# Patient Record
Sex: FEMALE | Race: WHITE
[De-identification: names, ages, dates, MRNs, and addresses within clinical notes are randomized per-mention and may not be internally consistent; named-entity substitution may affect disease eponyms.]

---

## 2017-02-10 ENCOUNTER — HOSPITAL ENCOUNTER (INPATIENT)
Dept: HOSPITAL 4 - SED | Age: 77
LOS: 2 days | Discharge: HOME | DRG: 193 | End: 2017-02-12
Payer: COMMERCIAL

## 2017-02-10 VITALS — WEIGHT: 166.03 LBS | HEIGHT: 60 IN | BODY MASS INDEX: 32.6 KG/M2

## 2017-02-10 VITALS
DIASTOLIC BLOOD PRESSURE: 67 MMHG | TEMPERATURE: 97.5 F | RESPIRATION RATE: 18 BRPM | SYSTOLIC BLOOD PRESSURE: 141 MMHG | HEART RATE: 78 BPM | OXYGEN SATURATION: 91 %

## 2017-02-10 DIAGNOSIS — I10: ICD-10-CM

## 2017-02-10 DIAGNOSIS — Z87.891: ICD-10-CM

## 2017-02-10 DIAGNOSIS — J18.9: Primary | ICD-10-CM

## 2017-02-10 DIAGNOSIS — D64.9: ICD-10-CM

## 2017-02-10 DIAGNOSIS — E11.00: ICD-10-CM

## 2017-02-10 DIAGNOSIS — Z79.899: ICD-10-CM

## 2017-02-10 DIAGNOSIS — E78.00: ICD-10-CM

## 2017-02-10 DIAGNOSIS — J44.9: ICD-10-CM

## 2017-02-10 LAB
ABG TOTAL HEMOGLOBIN: 10 G/DL (ref 12–18)
ALBUMIN SERPL BCP-MCNC: 2.8 G/DL (ref 3.4–4.8)
ALT SERPL W P-5'-P-CCNC: 19 U/L (ref 12–78)
ANION GAP SERPL CALCULATED.3IONS-SCNC: 8 MMOL/L (ref 5–15)
AST SERPL W P-5'-P-CCNC: 20 U/L (ref 10–37)
BASE EXCESS BLDA CALC-SCNC: 2.5 MMOL/L (ref -3–3)
BASOPHILS # BLD AUTO: 0 K/UL (ref 0–0.2)
BASOPHILS NFR BLD AUTO: 0.1 % (ref 0–2)
BILIRUB SERPL-MCNC: 0.2 MG/DL (ref 0–1)
BLOOD GAS COHB%: 0.3 % (ref 0.5–1.5)
BLOOD GAS HHB: 11.8 % (ref 0–6)
BLOOD O2HB%: 87.3 % (ref 94–97)
BUN SERPL-MCNC: 32 MG/DL (ref 8–21)
CALCIUM SERPL-MCNC: 8.5 MG/DL (ref 8.4–11)
CHLORIDE SERPL-SCNC: 100 MMOL/L (ref 98–107)
CREAT SERPL-MCNC: 0.98 MG/DL (ref 0.55–1.3)
EOSINOPHIL # BLD AUTO: 0 K/UL (ref 0–0.4)
EOSINOPHIL NFR BLD AUTO: 0.1 % (ref 0–4)
ERYTHROCYTE [DISTWIDTH] IN BLOOD BY AUTOMATED COUNT: 14.2 % (ref 9–15)
GFR SERPL CREATININE-BSD FRML MDRD: (no result) ML/MIN (ref 90–?)
GLUCOSE SERPL-MCNC: 413 MG/DL (ref 70–99)
HCT VFR BLD AUTO: 27.5 % (ref 36–48)
HGB BLD-MCNC: 8.9 G/DL (ref 12–16)
LYMPHOCYTES # BLD AUTO: 0.7 K/UL (ref 1–5.5)
LYMPHOCYTES NFR BLD AUTO: 18.4 % (ref 20.5–51.5)
MCH RBC QN AUTO: 31 PG (ref 27–31)
MCHC RBC AUTO-ENTMCNC: 32 % (ref 32–36)
MCV RBC AUTO: 95 FL (ref 79–98)
MONOCYTES # BLD MANUAL: 0.1 K/UL (ref 0–1)
MONOCYTES # BLD MANUAL: 2.5 % (ref 1.7–9.3)
NEUTROPHILS # BLD AUTO: 3.2 K/UL (ref 1.8–7.7)
NEUTROPHILS NFR BLD AUTO: 78.9 % (ref 40–70)
PH BLDA: 7.44 [PH] (ref 7.35–7.45)
PLATELET # BLD AUTO: 308 K/UL (ref 130–430)
POTASSIUM SERPL-SCNC: 4.2 MMOL/L (ref 3.5–5.1)
PROT SERPL-MCNC: 6.9 G/DL (ref 6.4–8.3)
RBC # BLD AUTO: 2.91 MIL/UL (ref 4.2–6.2)
SODIUM SERPLBLD-SCNC: 136 MMOL/L (ref 136–145)
WBC # BLD AUTO: 4 K/UL (ref 4.8–10.8)

## 2017-02-10 NOTE — NUR
Pt here in ED from urgent care center for diagnosis of pneumonia. Pt has hx of 
COPD, is at around 90 % O2 saturation at urgent care. Pt denied SOB or chest 
pain at moment, no respiratory distress noted. Pt was given Rocephin 1mg, 
solumedrol 125mg, and 1L NS at urgent care. X ray of chest was also done at the 
urgent care center. Pt's BS was 486 at noon per pt. At urgent care, pt was 
given 18 units of Regular insulin at around 1300. BS at moment is 342. Dr. Landa 
aware. Pt is alert and oriented x4, diminished lung sounds at bases, pt denied 
pain at moment. Pt is placed on cardiac and continuous pulse oximeter. Will 
continue to monitor pt.

## 2017-02-10 NOTE — NUR
Pt given extra pads and underwear, states "I wet my underwear when I cough a 
lot." Pt is at 91 % O2 sat MD ERICKA aware.

## 2017-02-11 VITALS
RESPIRATION RATE: 18 BRPM | DIASTOLIC BLOOD PRESSURE: 59 MMHG | TEMPERATURE: 97.4 F | SYSTOLIC BLOOD PRESSURE: 152 MMHG | OXYGEN SATURATION: 93 % | HEART RATE: 76 BPM

## 2017-02-11 VITALS
HEART RATE: 57 BPM | SYSTOLIC BLOOD PRESSURE: 148 MMHG | DIASTOLIC BLOOD PRESSURE: 53 MMHG | OXYGEN SATURATION: 98 % | TEMPERATURE: 97.5 F | RESPIRATION RATE: 18 BRPM

## 2017-02-11 VITALS
DIASTOLIC BLOOD PRESSURE: 66 MMHG | HEART RATE: 75 BPM | OXYGEN SATURATION: 95 % | SYSTOLIC BLOOD PRESSURE: 155 MMHG | RESPIRATION RATE: 17 BRPM | TEMPERATURE: 97.9 F

## 2017-02-11 VITALS
DIASTOLIC BLOOD PRESSURE: 59 MMHG | TEMPERATURE: 97.4 F | HEART RATE: 76 BPM | SYSTOLIC BLOOD PRESSURE: 152 MMHG | RESPIRATION RATE: 18 BRPM | OXYGEN SATURATION: 93 %

## 2017-02-11 VITALS
SYSTOLIC BLOOD PRESSURE: 137 MMHG | RESPIRATION RATE: 19 BRPM | TEMPERATURE: 98.5 F | DIASTOLIC BLOOD PRESSURE: 39 MMHG | HEART RATE: 73 BPM

## 2017-02-11 VITALS
RESPIRATION RATE: 16 BRPM | OXYGEN SATURATION: 95 % | TEMPERATURE: 97.9 F | HEART RATE: 54 BPM | DIASTOLIC BLOOD PRESSURE: 68 MMHG | SYSTOLIC BLOOD PRESSURE: 145 MMHG

## 2017-02-11 VITALS
HEART RATE: 63 BPM | DIASTOLIC BLOOD PRESSURE: 55 MMHG | OXYGEN SATURATION: 99 % | RESPIRATION RATE: 18 BRPM | TEMPERATURE: 98.1 F | SYSTOLIC BLOOD PRESSURE: 129 MMHG

## 2017-02-11 VITALS — SYSTOLIC BLOOD PRESSURE: 129 MMHG | HEART RATE: 63 BPM | DIASTOLIC BLOOD PRESSURE: 55 MMHG

## 2017-02-11 RX ADMIN — IPRATROPIUM BROMIDE AND ALBUTEROL SULFATE SCH ML: .5; 3 SOLUTION RESPIRATORY (INHALATION) at 13:38

## 2017-02-11 RX ADMIN — Medication SCH EA: at 17:07

## 2017-02-11 RX ADMIN — IPRATROPIUM BROMIDE AND ALBUTEROL SULFATE SCH ML: .5; 3 SOLUTION RESPIRATORY (INHALATION) at 21:02

## 2017-02-11 RX ADMIN — IPRATROPIUM BROMIDE AND ALBUTEROL SULFATE SCH ML: .5; 3 SOLUTION RESPIRATORY (INHALATION) at 07:23

## 2017-02-11 RX ADMIN — SODIUM CHLORIDE SCH MLS/HR: 0.9 INJECTION, SOLUTION INTRAVENOUS at 04:05

## 2017-02-11 RX ADMIN — HUMAN INSULIN PRN UNITS: 100 INJECTION, SOLUTION SUBCUTANEOUS at 06:10

## 2017-02-11 RX ADMIN — Medication SCH MG: at 21:04

## 2017-02-11 RX ADMIN — Medication SCH EA: at 11:25

## 2017-02-11 RX ADMIN — HUMAN INSULIN PRN UNITS: 100 INJECTION, SOLUTION SUBCUTANEOUS at 21:12

## 2017-02-11 RX ADMIN — HUMAN INSULIN PRN UNITS: 100 INJECTION, SOLUTION SUBCUTANEOUS at 11:28

## 2017-02-11 RX ADMIN — SIMVASTATIN SCH MG: 40 TABLET, FILM COATED ORAL at 21:05

## 2017-02-11 RX ADMIN — HUMAN INSULIN PRN UNITS: 100 INJECTION, SOLUTION SUBCUTANEOUS at 17:14

## 2017-02-11 RX ADMIN — GLIPIZIDE SCH MG: 5 TABLET, EXTENDED RELEASE ORAL at 21:05

## 2017-02-11 RX ADMIN — Medication SCH EA: at 21:08

## 2017-02-11 RX ADMIN — Medication SCH EA: at 06:04

## 2017-02-11 RX ADMIN — CEFTRIAXONE SODIUM SCH MLS/HR: 1 INJECTION, SOLUTION INTRAVENOUS at 03:27

## 2017-02-11 NOTE — NUR
NOTES

DR. HERNANDEZ CALLED ;INFORMED REGARDING PATIENT'S BLOOD SUGAR=453;WITH ORDERS AND CARRIED OUT

## 2017-02-11 NOTE — NUR
INITIAL NOTES

RECEIVED PATIENT ON BED ASLEEP.BREATHING EVEN AND UNLABORED WITH O2 AT 2L/M VIA NASAL 
CANNULA.IVF INFUSING WELL;NO SIGNS AND SYMPTOMS OF INFILTRATION.SAFETY AND FALL PRECAUTIONS 
IN PLACE.CALL LIGHT WITHIN REACH

## 2017-02-11 NOTE — NUR
notes

received the pt from the day nurse.pt a/a/ox4no c/o sob or pain ,o2 via nc in place at 
2l/min.iv intact,no redness or swelling noted.pt watching tv ,call light within reach.safety 
measures in progress.continue to monitor.

## 2017-02-11 NOTE — NUR
notes

received the pt from the ER,pt a/a/ox4 O2 VIA NC IN PLACE AT 2L/MIN.MONITOR APPLIED TO PTS 
CHEST AND SHOWS SR.PT DENIES PAIN AND  SOB AT THIS TIME.PT ORIENTED TO ROOM AND THE CALL 
LIGH AND BED CONTROLS..SANDWICH JUICE AND JELLO GIVEN PER REQUEST.

## 2017-02-11 NOTE — NUR
NOTES

DR. HERNANDEZ IN THE UNIT;INFORMED REGARDING PATIENT'S BLOOD SUGAR=504;WITH ORDER TO ADD ANOTHER 
8 UNITS IN ADDITION TO THE SLIDING SCALE;CARRIED OUT

## 2017-02-11 NOTE — NUR
NOTES

ASSISTED UP TO THE BATHROOM .PT WITH GOOD STEADY GAIT.VOIDED AND THEN ASSISTED BACK TO 
BED.CALL LIGHT WITHIN REACH.

## 2017-02-11 NOTE — NUR
NOTES

BLOOD SUGAR=504;NO SIGNS AND SYMPTOMS OF HYPERGLYCEMIA.PER PATIENT SHE HAS NOT TAKEN HER 
DIABETIC PILLS SINCE THURS BECAUSE SHE WENT TO URGENT CARE

## 2017-02-11 NOTE — NUR
Patient will be admitted to care of Dr. Perez.  Admitted to tele unit.  Will go 
to room 118-b.  Belongings list completed.  Summary report printed. Report 
given to HERMINIA Lara.

## 2017-02-11 NOTE — NUR
ADMISSION NOTE

Received patient from ER via gurney. Patient admitted with diagnosis of Pneumonia. Patient 
is awake, alert, oriented X 3. Patient oriented to hospital room, call light, toileting, 
pain management and safety-teach back done. Patient informed that Jordi will be primary 
nurse and that their room number is 118B. Personal belongings checked and Belongings List 
documented. Call light within reach.

## 2017-02-11 NOTE — NUR
NOTES

BLOOD SUGAR=453;CHECKED AND ASSESSED PATIENT;NO ACUTE DISTRESS;NO SIGNS AND SYMPTOMS OF 
HYPERGLYCEMIA

## 2017-02-11 NOTE — NUR
CLOSING NOTES

PATIENT ON BED AWAKE.BREATHING EVEN AND UNLABORED.NO ACUTE DISTRESS.IV SALINE LOCK INTACT 
AND PATENT;NO SIGNS AND SYMPTOMS OF INFILTRATION.SAFETY AND FALL PRECAUTIONS IN PLACE.CALL 
LIGHT WITHIN REACH.WILL ENDORSE TO NEXT SHIFT ACCORDINGLY

## 2017-02-12 VITALS
DIASTOLIC BLOOD PRESSURE: 62 MMHG | OXYGEN SATURATION: 98 % | SYSTOLIC BLOOD PRESSURE: 160 MMHG | TEMPERATURE: 97.6 F | RESPIRATION RATE: 18 BRPM | HEART RATE: 55 BPM

## 2017-02-12 VITALS
HEART RATE: 57 BPM | TEMPERATURE: 97.2 F | DIASTOLIC BLOOD PRESSURE: 52 MMHG | OXYGEN SATURATION: 99 % | RESPIRATION RATE: 18 BRPM | SYSTOLIC BLOOD PRESSURE: 134 MMHG

## 2017-02-12 VITALS
DIASTOLIC BLOOD PRESSURE: 62 MMHG | RESPIRATION RATE: 18 BRPM | SYSTOLIC BLOOD PRESSURE: 160 MMHG | HEART RATE: 55 BPM | TEMPERATURE: 97.6 F | OXYGEN SATURATION: 98 %

## 2017-02-12 VITALS
SYSTOLIC BLOOD PRESSURE: 160 MMHG | OXYGEN SATURATION: 98 % | HEART RATE: 55 BPM | RESPIRATION RATE: 18 BRPM | DIASTOLIC BLOOD PRESSURE: 62 MMHG | TEMPERATURE: 97.6 F

## 2017-02-12 VITALS
HEART RATE: 62 BPM | SYSTOLIC BLOOD PRESSURE: 157 MMHG | OXYGEN SATURATION: 96 % | RESPIRATION RATE: 18 BRPM | DIASTOLIC BLOOD PRESSURE: 55 MMHG | TEMPERATURE: 97.7 F

## 2017-02-12 VITALS
RESPIRATION RATE: 20 BRPM | HEART RATE: 64 BPM | TEMPERATURE: 97.3 F | SYSTOLIC BLOOD PRESSURE: 150 MMHG | OXYGEN SATURATION: 91 % | DIASTOLIC BLOOD PRESSURE: 51 MMHG

## 2017-02-12 VITALS
HEART RATE: 55 BPM | OXYGEN SATURATION: 98 % | DIASTOLIC BLOOD PRESSURE: 62 MMHG | SYSTOLIC BLOOD PRESSURE: 160 MMHG | TEMPERATURE: 97.6 F | RESPIRATION RATE: 18 BRPM

## 2017-02-12 VITALS
TEMPERATURE: 97.4 F | DIASTOLIC BLOOD PRESSURE: 47 MMHG | OXYGEN SATURATION: 96 % | HEART RATE: 51 BPM | SYSTOLIC BLOOD PRESSURE: 139 MMHG | RESPIRATION RATE: 17 BRPM

## 2017-02-12 VITALS
DIASTOLIC BLOOD PRESSURE: 52 MMHG | SYSTOLIC BLOOD PRESSURE: 131 MMHG | TEMPERATURE: 97.4 F | RESPIRATION RATE: 20 BRPM | OXYGEN SATURATION: 96 % | HEART RATE: 82 BPM

## 2017-02-12 LAB
ALBUMIN SERPL BCP-MCNC: 2.6 G/DL (ref 3.4–4.8)
ALT SERPL W P-5'-P-CCNC: 30 U/L (ref 12–78)
ANION GAP SERPL CALCULATED.3IONS-SCNC: 7 MMOL/L (ref 5–15)
AST SERPL W P-5'-P-CCNC: 22 U/L (ref 10–37)
BASOPHILS # BLD AUTO: 0 K/UL (ref 0–0.2)
BASOPHILS NFR BLD AUTO: 0 % (ref 0–2)
BILIRUB SERPL-MCNC: 0.2 MG/DL (ref 0–1)
BUN SERPL-MCNC: 28 MG/DL (ref 8–21)
CALCIUM SERPL-MCNC: 8.2 MG/DL (ref 8.4–11)
CHLORIDE SERPL-SCNC: 102 MMOL/L (ref 98–107)
CREAT SERPL-MCNC: 0.95 MG/DL (ref 0.55–1.3)
EOSINOPHIL # BLD AUTO: 0 K/UL (ref 0–0.4)
EOSINOPHIL NFR BLD AUTO: 0 % (ref 0–4)
ERYTHROCYTE [DISTWIDTH] IN BLOOD BY AUTOMATED COUNT: 13.7 % (ref 9–15)
GFR SERPL CREATININE-BSD FRML MDRD: (no result) ML/MIN (ref 90–?)
GLUCOSE SERPL-MCNC: 440 MG/DL (ref 70–99)
HCT VFR BLD AUTO: 26.1 % (ref 36–48)
HGB BLD-MCNC: 8.5 G/DL (ref 12–16)
IRON SERPL-MCNC: 11 MCG/DL (ref 37–145)
LYMPHOCYTES # BLD AUTO: 0.7 K/UL (ref 1–5.5)
LYMPHOCYTES NFR BLD AUTO: 7 % (ref 20.5–51.5)
MCH RBC QN AUTO: 31 PG (ref 27–31)
MCHC RBC AUTO-ENTMCNC: 33 % (ref 32–36)
MCV RBC AUTO: 94 FL (ref 79–98)
MONOCYTES # BLD MANUAL: 0.3 K/UL (ref 0–1)
MONOCYTES # BLD MANUAL: 2.7 % (ref 1.7–9.3)
NEUTROPHILS # BLD AUTO: 8.6 K/UL (ref 1.8–7.7)
NEUTROPHILS NFR BLD AUTO: 90.3 % (ref 40–70)
PLATELET # BLD AUTO: 329 K/UL (ref 130–430)
POTASSIUM SERPL-SCNC: 3.7 MMOL/L (ref 3.5–5.1)
PROT SERPL-MCNC: 6.2 G/DL (ref 6.4–8.3)
RBC # BLD AUTO: 2.77 MIL/UL (ref 4.2–6.2)
SODIUM SERPLBLD-SCNC: 138 MMOL/L (ref 136–145)
TIBC SERPL-MCNC: 252 UG/DL (ref 250–450)
WBC # BLD AUTO: 9.6 K/UL (ref 4.8–10.8)

## 2017-02-12 RX ADMIN — CEFTRIAXONE SODIUM SCH MLS/HR: 1 INJECTION, SOLUTION INTRAVENOUS at 01:49

## 2017-02-12 RX ADMIN — SIMVASTATIN SCH MG: 40 TABLET, FILM COATED ORAL at 20:39

## 2017-02-12 RX ADMIN — Medication SCH EA: at 05:54

## 2017-02-12 RX ADMIN — HUMAN INSULIN PRN UNITS: 100 INJECTION, SOLUTION SUBCUTANEOUS at 08:04

## 2017-02-12 RX ADMIN — IPRATROPIUM BROMIDE AND ALBUTEROL SULFATE SCH ML: .5; 3 SOLUTION RESPIRATORY (INHALATION) at 19:31

## 2017-02-12 RX ADMIN — GLIPIZIDE SCH MG: 5 TABLET, EXTENDED RELEASE ORAL at 09:17

## 2017-02-12 RX ADMIN — HUMAN INSULIN PRN UNITS: 100 INJECTION, SOLUTION SUBCUTANEOUS at 20:45

## 2017-02-12 RX ADMIN — Medication SCH EA: at 20:49

## 2017-02-12 RX ADMIN — HUMAN INSULIN PRN UNITS: 100 INJECTION, SOLUTION SUBCUTANEOUS at 17:29

## 2017-02-12 RX ADMIN — Medication SCH EA: at 17:28

## 2017-02-12 RX ADMIN — IPRATROPIUM BROMIDE AND ALBUTEROL SULFATE SCH ML: .5; 3 SOLUTION RESPIRATORY (INHALATION) at 04:23

## 2017-02-12 RX ADMIN — SODIUM CHLORIDE SCH MLS/HR: 0.9 INJECTION, SOLUTION INTRAVENOUS at 02:45

## 2017-02-12 RX ADMIN — Medication SCH MG: at 09:17

## 2017-02-12 RX ADMIN — HUMAN INSULIN PRN UNITS: 100 INJECTION, SOLUTION SUBCUTANEOUS at 06:00

## 2017-02-12 RX ADMIN — Medication SCH MG: at 20:40

## 2017-02-12 RX ADMIN — Medication SCH EA: at 11:27

## 2017-02-12 RX ADMIN — HUMAN INSULIN PRN UNITS: 100 INJECTION, SOLUTION SUBCUTANEOUS at 11:30

## 2017-02-12 RX ADMIN — GLIPIZIDE SCH MG: 5 TABLET, EXTENDED RELEASE ORAL at 20:39

## 2017-02-12 RX ADMIN — IPRATROPIUM BROMIDE AND ALBUTEROL SULFATE SCH ML: .5; 3 SOLUTION RESPIRATORY (INHALATION) at 17:45

## 2017-02-12 NOTE — NUR
Initial Note

Patient A/O x4. Respirations even and unlabored with use of nasal canula. Denies shortness 
of breath and difficulty breathing at this time. IV access patent. Use of call light 
reviewed with patient, she acknowledged understanding. Fall and safety precautions in place. 
Encouraged patient to call for assistance when needed. Bed in lowest and locked position.

## 2017-02-12 NOTE — NUR
returned call.i have apprised  of the pt's situation 2 b d/c home 
tonight. has maintained order 4 d/c home tonight.i have apprised the pt.2 d/c home 
tonight.

## 2017-02-12 NOTE — NUR
pt.assessed.v/s assessed.values w/in normal limits.pt.inquired if she is 2 b d/c.home 
tonight.am 2 f/u place call 2 

2larify pt's d/c.

## 2017-02-12 NOTE — NUR
Notes

Additional 7 units novolog administered. Juice at bedside in case of occurrence of 
hypoglycemia.

## 2017-02-12 NOTE — NUR
pt.assessed.apprised pt.that i have placed call 2  to f/u [facility] d/c [facility].

## 2017-02-12 NOTE — NUR
Closing Notes

Patient needs met throughout shift. Hourly rounds completed. Blood glucose has been reported 
and to Dr. Perez and addressed as ordered. Patient remained free of acute distress throughout 
shift. Will continue to monitor until patient care is endorsed to oncoming shift nurse.

## 2017-02-12 NOTE — NUR
pt.d/c home.pt's inctructions provided.belongings-list reviewed.flu vaccine declined per 
pt.dtr assisted.accompanied pt.home.

## 2017-02-12 NOTE — NUR
Notes

Dr. Perez has been informed of critical glucose level. She gave TO for one-time blood glucose 
spot check and administer coverage per sliding scale. TO entered and carried out. All TO 
orders entered. Dr. Perez has seen patient and is aware patient stated she feels dizzy. 
Orthostatic pressure check completed and charted; no drop in blood pressure noted. 
Encouraged patient to call for assistance.

## 2017-02-12 NOTE — NUR
Notes

Dr. Perez has been called to inform of critical glucose levels. 12 units regular insulin 
administered per sliding scale. Patient denies feelings of dizziness and shakiness at this 
time.

## 2017-02-12 NOTE — NUR
Notes

Spoke with Dr. Perez and informed her of last blood glucose of 314. Recommended if she would 
like to hold discharge and continue monitoring blood glucose. She stated patient does not 
meet criteria to hold discharge. She stated to administer additional 5 units of novolog SQ 
and discharge patient. Additional instructions for patient to monitor blood glucose and 
follow up with primary care provider tomorrow. Orders entered and will endorse care to 
oncoming shift nurse.

## 2017-02-15 NOTE — NUR
Discharge Follow Up Phone Call:

LCSW called and spoke with pt ([phone]).  Pt states that she is not feeling better; pt 
did not express any questions regarding discharge or medication instructions; pt attended 
PCP follow up appointment today; pt's PCP provided pt with prescriptions and instructions to 
address pt's current symptoms; pt checks her blood sugar as directed by PCP.  Pt did not 
express any other needs or concerns and denied the need for additional follow up at this 
time.  No further follow up phone calls required at this time.

## 2022-04-09 NOTE — NUR
change of shift.initial pt.assessment.apprised per day-shift nsg;susannah of elevated blood 
glucose:314mg/dl.pt.

pt.presents cough,sob upon excertion.o2 via nasal cannulae@2l/min.call light w/in pt's 
reach. Improved

## 2023-04-03 ENCOUNTER — HOSPITAL ENCOUNTER (INPATIENT)
Dept: HOSPITAL 4 - SED | Age: 83
LOS: 10 days | Discharge: HOME HEALTH | DRG: 637 | End: 2023-04-13
Attending: SPECIALIST | Admitting: SPECIALIST
Payer: COMMERCIAL

## 2023-04-03 VITALS — SYSTOLIC BLOOD PRESSURE: 135 MMHG

## 2023-04-03 VITALS — HEIGHT: 60 IN | BODY MASS INDEX: 31.8 KG/M2 | WEIGHT: 162 LBS

## 2023-04-03 VITALS — SYSTOLIC BLOOD PRESSURE: 130 MMHG

## 2023-04-03 VITALS — SYSTOLIC BLOOD PRESSURE: 151 MMHG

## 2023-04-03 VITALS — SYSTOLIC BLOOD PRESSURE: 157 MMHG

## 2023-04-03 DIAGNOSIS — K63.5: ICD-10-CM

## 2023-04-03 DIAGNOSIS — E87.6: ICD-10-CM

## 2023-04-03 DIAGNOSIS — E11.65: ICD-10-CM

## 2023-04-03 DIAGNOSIS — Z79.82: ICD-10-CM

## 2023-04-03 DIAGNOSIS — I50.43: ICD-10-CM

## 2023-04-03 DIAGNOSIS — Z20.822: ICD-10-CM

## 2023-04-03 DIAGNOSIS — E11.00: Primary | ICD-10-CM

## 2023-04-03 DIAGNOSIS — K62.1: ICD-10-CM

## 2023-04-03 DIAGNOSIS — E88.09: ICD-10-CM

## 2023-04-03 DIAGNOSIS — D50.9: ICD-10-CM

## 2023-04-03 DIAGNOSIS — K59.00: ICD-10-CM

## 2023-04-03 DIAGNOSIS — I11.0: ICD-10-CM

## 2023-04-03 DIAGNOSIS — N17.9: ICD-10-CM

## 2023-04-03 DIAGNOSIS — E83.51: ICD-10-CM

## 2023-04-03 DIAGNOSIS — J44.0: ICD-10-CM

## 2023-04-03 DIAGNOSIS — E43: ICD-10-CM

## 2023-04-03 DIAGNOSIS — Z87.891: ICD-10-CM

## 2023-04-03 DIAGNOSIS — D53.9: ICD-10-CM

## 2023-04-03 DIAGNOSIS — Z79.84: ICD-10-CM

## 2023-04-03 DIAGNOSIS — E78.00: ICD-10-CM

## 2023-04-03 DIAGNOSIS — E83.52: ICD-10-CM

## 2023-04-03 DIAGNOSIS — J18.9: ICD-10-CM

## 2023-04-03 DIAGNOSIS — J96.01: ICD-10-CM

## 2023-04-03 DIAGNOSIS — K25.9: ICD-10-CM

## 2023-04-03 DIAGNOSIS — Z79.4: ICD-10-CM

## 2023-04-03 DIAGNOSIS — I35.0: ICD-10-CM

## 2023-04-03 DIAGNOSIS — K64.4: ICD-10-CM

## 2023-04-03 LAB
ACETONE EXG QL: (no result)
ALBUMIN SERPL BCP-MCNC: 1.5 G/DL (ref 3.4–4.8)
ALT SERPL W P-5'-P-CCNC: 20 U/L (ref 12–78)
ANION GAP SERPL CALCULATED.3IONS-SCNC: 13 MMOL/L (ref 5–15)
APPEARANCE UR: CLEAR
AST SERPL W P-5'-P-CCNC: 27 U/L (ref 10–37)
BACTERIA URNS QL MICRO: (no result) /HPF
BASOPHILS # BLD AUTO: 0.1 K/UL (ref 0–0.2)
BASOPHILS NFR BLD AUTO: 0.9 % (ref 0–2)
BILIRUB SERPL-MCNC: 0.4 MG/DL (ref 0–1)
BILIRUB UR QL STRIP: NEGATIVE
BUN SERPL-MCNC: 68 MG/DL (ref 8–21)
CALCIUM SERPL-MCNC: 7.7 MG/DL (ref 8.4–11)
CHLORIDE SERPL-SCNC: 100 MMOL/L (ref 98–107)
COLOR UR: YELLOW
CREAT SERPL-MCNC: 1.85 MG/DL (ref 0.55–1.3)
EOSINOPHIL # BLD AUTO: 0 K/UL (ref 0–0.4)
EOSINOPHIL NFR BLD AUTO: 0 % (ref 0–4)
ERYTHROCYTE [DISTWIDTH] IN BLOOD BY AUTOMATED COUNT: 16.6 % (ref 9–15)
GFR SERPL CREATININE-BSD FRML MDRD: (no result) ML/MIN (ref 90–?)
GLUCOSE SERPL-MCNC: 376 MG/DL (ref 70–99)
GLUCOSE UR STRIP-MCNC: (no result) MG/DL
HCT VFR BLD AUTO: 11.9 % (ref 36–48)
HGB BLD-MCNC: 3.8 G/DL (ref 12–16)
HGB UR QL STRIP: (no result)
KETONES UR STRIP-MCNC: (no result) MG/DL
LEUKOCYTE ESTERASE UR QL STRIP: NEGATIVE
LIPASE SERPL-CCNC: 123 U/L (ref 73–393)
LYMPHOCYTES # BLD AUTO: 1.3 K/UL (ref 1–5.5)
LYMPHOCYTES NFR BLD AUTO: 19.3 % (ref 20.5–51.5)
MCH RBC QN AUTO: 32 PG (ref 27–31)
MCHC RBC AUTO-ENTMCNC: 32 % (ref 32–36)
MCV RBC AUTO: 101 FL (ref 79–98)
MONOCYTES # BLD MANUAL: 0.2 K/UL (ref 0–1)
MONOCYTES # BLD MANUAL: 3.7 % (ref 1.7–9.3)
NEUTROPHILS # BLD AUTO: 5 K/UL (ref 1.8–7.7)
NEUTROPHILS NFR BLD AUTO: 76.1 % (ref 40–70)
NITRITE UR QL STRIP: NEGATIVE
PH UR STRIP: 6 [PH] (ref 5–8)
PLATELET # BLD AUTO: 299 K/UL (ref 130–430)
PROT UR QL STRIP: (no result)
RBC # BLD AUTO: 1.17 MIL/UL (ref 4.2–6.2)
RBC #/AREA URNS HPF: (no result) /HPF (ref 0–3)
SP GR UR STRIP: 1.02 (ref 1–1.03)
TIBC SERPL-MCNC: 209 UG/DL (ref 250–450)
UROBILINOGEN UR STRIP-MCNC: 0.2 MG/DL (ref 0.2–1)
WBC # BLD AUTO: 6.6 K/UL (ref 4.8–10.8)
WBC #/AREA URNS HPF: (no result) /HPF (ref 0–3)

## 2023-04-03 PROCEDURE — G0378 HOSPITAL OBSERVATION PER HR: HCPCS

## 2023-04-03 PROCEDURE — C9113 INJ PANTOPRAZOLE SODIUM, VIA: HCPCS

## 2023-04-03 PROCEDURE — 30233N1 TRANSFUSION OF NONAUTOLOGOUS RED BLOOD CELLS INTO PERIPHERAL VEIN, PERCUTANEOUS APPROACH: ICD-10-PCS | Performed by: FAMILY MEDICINE

## 2023-04-03 PROCEDURE — P9041 ALBUMIN (HUMAN),5%, 50ML: HCPCS

## 2023-04-03 PROCEDURE — P9021 RED BLOOD CELLS UNIT: HCPCS

## 2023-04-03 PROCEDURE — A9575 INJ GADOTERATE MEGLUMI 0.1ML: HCPCS

## 2023-04-04 VITALS — SYSTOLIC BLOOD PRESSURE: 153 MMHG

## 2023-04-04 VITALS — SYSTOLIC BLOOD PRESSURE: 154 MMHG

## 2023-04-04 VITALS — SYSTOLIC BLOOD PRESSURE: 160 MMHG

## 2023-04-04 VITALS — SYSTOLIC BLOOD PRESSURE: 120 MMHG

## 2023-04-04 LAB
ANION GAP SERPL CALCULATED.3IONS-SCNC: 5 MMOL/L (ref 5–15)
BASOPHILS # BLD AUTO: 0.1 K/UL (ref 0–0.2)
BASOPHILS NFR BLD AUTO: 1 % (ref 0–2)
BUN SERPL-MCNC: 55 MG/DL (ref 8–21)
CALCIUM SERPL-MCNC: 7.6 MG/DL (ref 8.4–11)
CHLORIDE SERPL-SCNC: 102 MMOL/L (ref 98–107)
CREAT SERPL-MCNC: 1.67 MG/DL (ref 0.55–1.3)
EOSINOPHIL # BLD AUTO: 0.1 K/UL (ref 0–0.4)
EOSINOPHIL NFR BLD AUTO: 0.7 % (ref 0–4)
ERYTHROCYTE [DISTWIDTH] IN BLOOD BY AUTOMATED COUNT: 18.4 % (ref 9–15)
FERRITIN: 48 NG/ML (ref 15–150)
FOLATE (FOLIC ACID): >20 NG/ML (ref 3–?)
GFR SERPL CREATININE-BSD FRML MDRD: (no result) ML/MIN (ref 90–?)
GLUCOSE SERPL-MCNC: 531 MG/DL (ref 70–99)
HCT VFR BLD AUTO: 22.6 % (ref 36–48)
HGB BLD-MCNC: 7.3 G/DL (ref 12–16)
LYMPHOCYTES # BLD AUTO: 1.1 K/UL (ref 1–5.5)
LYMPHOCYTES NFR BLD AUTO: 11.3 % (ref 20.5–51.5)
MCH RBC QN AUTO: 30 PG (ref 27–31)
MCHC RBC AUTO-ENTMCNC: 32 % (ref 32–36)
MCV RBC AUTO: 92 FL (ref 79–98)
MONOCYTES # BLD MANUAL: 0.5 K/UL (ref 0–1)
MONOCYTES # BLD MANUAL: 5.5 % (ref 1.7–9.3)
NEUTROPHILS # BLD AUTO: 7.8 K/UL (ref 1.8–7.7)
NEUTROPHILS NFR BLD AUTO: 81.5 % (ref 40–70)
PLATELET # BLD AUTO: 298 K/UL (ref 130–430)
RBC # BLD AUTO: 2.44 MIL/UL (ref 4.2–6.2)
TSH SERPL DL<=0.05 MIU/L-ACNC: 2.35 UIU/ML (ref 0.34–4.82)
VIT B12 SERPL-MCNC: >2000 PG/ML (ref 232–1245)
WBC # BLD AUTO: 9.6 K/UL (ref 4.8–10.8)

## 2023-04-04 RX ADMIN — Medication SCH EA: at 16:26

## 2023-04-04 RX ADMIN — SODIUM CHLORIDE SCH MG: 9 INJECTION, SOLUTION INTRAVENOUS at 21:06

## 2023-04-04 RX ADMIN — CARVEDILOL SCH MG: 25 TABLET, FILM COATED ORAL at 21:07

## 2023-04-04 RX ADMIN — Medication SCH EA: at 21:07

## 2023-04-04 RX ADMIN — SIMVASTATIN SCH MG: 40 TABLET, FILM COATED ORAL at 21:07

## 2023-04-04 RX ADMIN — SODIUM CHLORIDE SCH MLS/HR: 9 INJECTION, SOLUTION INTRAVENOUS at 15:45

## 2023-04-04 RX ADMIN — HUMAN INSULIN PRN UNITS: 100 INJECTION, SOLUTION SUBCUTANEOUS at 08:39

## 2023-04-04 RX ADMIN — HUMAN INSULIN PRN UNITS: 100 INJECTION, SOLUTION SUBCUTANEOUS at 16:43

## 2023-04-05 VITALS — SYSTOLIC BLOOD PRESSURE: 156 MMHG

## 2023-04-05 VITALS — SYSTOLIC BLOOD PRESSURE: 146 MMHG

## 2023-04-05 VITALS — SYSTOLIC BLOOD PRESSURE: 124 MMHG

## 2023-04-05 VITALS — SYSTOLIC BLOOD PRESSURE: 135 MMHG

## 2023-04-05 VITALS — SYSTOLIC BLOOD PRESSURE: 121 MMHG

## 2023-04-05 LAB
ALBUMIN SERPL BCP-MCNC: 1.3 G/DL (ref 3.4–4.8)
ALT SERPL W P-5'-P-CCNC: 89 U/L (ref 12–78)
ANION GAP SERPL CALCULATED.3IONS-SCNC: 8 MMOL/L (ref 5–15)
AST SERPL W P-5'-P-CCNC: 70 U/L (ref 10–37)
BASOPHILS # BLD AUTO: 0.1 K/UL (ref 0–0.2)
BASOPHILS NFR BLD AUTO: 0.8 % (ref 0–2)
BILIRUB SERPL-MCNC: 0.6 MG/DL (ref 0–1)
BUN SERPL-MCNC: 43 MG/DL (ref 8–21)
CALCIUM SERPL-MCNC: 7.5 MG/DL (ref 8.4–11)
CHLORIDE SERPL-SCNC: 105 MMOL/L (ref 98–107)
CREAT SERPL-MCNC: 1.42 MG/DL (ref 0.55–1.3)
EOSINOPHIL # BLD AUTO: 0.4 K/UL (ref 0–0.4)
EOSINOPHIL NFR BLD AUTO: 4 % (ref 0–4)
ERYTHROCYTE [DISTWIDTH] IN BLOOD BY AUTOMATED COUNT: 18.2 % (ref 9–15)
GFR SERPL CREATININE-BSD FRML MDRD: (no result) ML/MIN (ref 90–?)
GLUCOSE SERPL-MCNC: 229 MG/DL (ref 70–99)
HCT VFR BLD AUTO: 21.1 % (ref 36–48)
HCT VFR BLD AUTO: 25.5 % (ref 36–48)
HGB BLD-MCNC: 7 G/DL (ref 12–16)
HGB BLD-MCNC: 8.5 G/DL (ref 12–16)
LYMPHOCYTES # BLD AUTO: 1.4 K/UL (ref 1–5.5)
LYMPHOCYTES NFR BLD AUTO: 14.8 % (ref 20.5–51.5)
MCH RBC QN AUTO: 30 PG (ref 27–31)
MCHC RBC AUTO-ENTMCNC: 33 % (ref 32–36)
MCV RBC AUTO: 91 FL (ref 79–98)
MONOCYTES # BLD MANUAL: 0.5 K/UL (ref 0–1)
MONOCYTES # BLD MANUAL: 5.1 % (ref 1.7–9.3)
NEUTROPHILS # BLD AUTO: 7.3 K/UL (ref 1.8–7.7)
NEUTROPHILS NFR BLD AUTO: 75.3 % (ref 40–70)
PHOSPHATE SERPL-MCNC: 3 MG/DL (ref 2.7–4.5)
PLATELET # BLD AUTO: 284 K/UL (ref 130–430)
RBC # BLD AUTO: 2.33 MIL/UL (ref 4.2–6.2)
WBC # BLD AUTO: 9.7 K/UL (ref 4.8–10.8)

## 2023-04-05 RX ADMIN — Medication SCH UNIT: at 08:36

## 2023-04-05 RX ADMIN — Medication SCH EA: at 16:41

## 2023-04-05 RX ADMIN — HUMAN INSULIN PRN UNITS: 100 INJECTION, SOLUTION SUBCUTANEOUS at 16:52

## 2023-04-05 RX ADMIN — CARVEDILOL SCH MG: 25 TABLET, FILM COATED ORAL at 21:19

## 2023-04-05 RX ADMIN — SODIUM FERRIC GLUCONATE COMPLEX IN SUCROSE SCH MLS/HR: 12.5 INJECTION INTRAVENOUS at 10:47

## 2023-04-05 RX ADMIN — SODIUM CHLORIDE SCH MG: 9 INJECTION, SOLUTION INTRAVENOUS at 21:20

## 2023-04-05 RX ADMIN — SODIUM CHLORIDE SCH MLS/HR: 9 INJECTION, SOLUTION INTRAVENOUS at 05:58

## 2023-04-05 RX ADMIN — CARVEDILOL SCH MG: 25 TABLET, FILM COATED ORAL at 08:35

## 2023-04-05 RX ADMIN — SIMVASTATIN SCH MG: 40 TABLET, FILM COATED ORAL at 21:19

## 2023-04-05 RX ADMIN — Medication SCH EA: at 21:20

## 2023-04-05 RX ADMIN — HUMAN INSULIN PRN UNITS: 100 INJECTION, SOLUTION SUBCUTANEOUS at 11:58

## 2023-04-05 RX ADMIN — HUMAN INSULIN PRN UNITS: 100 INJECTION, SOLUTION SUBCUTANEOUS at 21:27

## 2023-04-05 RX ADMIN — Medication SCH EA: at 11:36

## 2023-04-05 RX ADMIN — Medication SCH EA: at 06:01

## 2023-04-05 RX ADMIN — SODIUM CHLORIDE SCH MG: 9 INJECTION, SOLUTION INTRAVENOUS at 08:37

## 2023-04-06 VITALS — SYSTOLIC BLOOD PRESSURE: 134 MMHG

## 2023-04-06 VITALS — SYSTOLIC BLOOD PRESSURE: 155 MMHG

## 2023-04-06 VITALS — SYSTOLIC BLOOD PRESSURE: 162 MMHG

## 2023-04-06 VITALS — SYSTOLIC BLOOD PRESSURE: 152 MMHG

## 2023-04-06 VITALS — SYSTOLIC BLOOD PRESSURE: 145 MMHG

## 2023-04-06 LAB
ANION GAP SERPL CALCULATED.3IONS-SCNC: 7 MMOL/L (ref 5–15)
BASOPHILS # BLD AUTO: 0.1 K/UL (ref 0–0.2)
BASOPHILS NFR BLD AUTO: 0.7 % (ref 0–2)
BUN SERPL-MCNC: 31 MG/DL (ref 8–21)
CALCIUM SERPL-MCNC: 7.5 MG/DL (ref 8.4–11)
CHLORIDE SERPL-SCNC: 107 MMOL/L (ref 98–107)
CREAT SERPL-MCNC: 1.34 MG/DL (ref 0.55–1.3)
EOSINOPHIL # BLD AUTO: 0.3 K/UL (ref 0–0.4)
EOSINOPHIL NFR BLD AUTO: 3.9 % (ref 0–4)
ERYTHROCYTE [DISTWIDTH] IN BLOOD BY AUTOMATED COUNT: 17.1 % (ref 9–15)
GFR SERPL CREATININE-BSD FRML MDRD: (no result) ML/MIN (ref 90–?)
GLUCOSE SERPL-MCNC: 129 MG/DL (ref 70–99)
HCT VFR BLD AUTO: 24.6 % (ref 36–48)
HGB BLD-MCNC: 8.1 G/DL (ref 12–16)
LYMPHOCYTES # BLD AUTO: 1.5 K/UL (ref 1–5.5)
LYMPHOCYTES NFR BLD AUTO: 16.7 % (ref 20.5–51.5)
MCH RBC QN AUTO: 30 PG (ref 27–31)
MCHC RBC AUTO-ENTMCNC: 33 % (ref 32–36)
MCV RBC AUTO: 89 FL (ref 79–98)
MONOCYTES # BLD MANUAL: 0.5 K/UL (ref 0–1)
MONOCYTES # BLD MANUAL: 5.8 % (ref 1.7–9.3)
NEUTROPHILS # BLD AUTO: 6.3 K/UL (ref 1.8–7.7)
NEUTROPHILS NFR BLD AUTO: 72.9 % (ref 40–70)
PHOSPHATE SERPL-MCNC: 2.7 MG/DL (ref 2.7–4.5)
PLATELET # BLD AUTO: 250 K/UL (ref 130–430)
RBC # BLD AUTO: 2.75 MIL/UL (ref 4.2–6.2)
WBC # BLD AUTO: 8.7 K/UL (ref 4.8–10.8)

## 2023-04-06 PROCEDURE — 0DBP8ZZ EXCISION OF RECTUM, VIA NATURAL OR ARTIFICIAL OPENING ENDOSCOPIC: ICD-10-PCS | Performed by: INTERNAL MEDICINE

## 2023-04-06 PROCEDURE — 0DB78ZX EXCISION OF STOMACH, PYLORUS, VIA NATURAL OR ARTIFICIAL OPENING ENDOSCOPIC, DIAGNOSTIC: ICD-10-PCS | Performed by: INTERNAL MEDICINE

## 2023-04-06 PROCEDURE — 0DB58ZX EXCISION OF ESOPHAGUS, VIA NATURAL OR ARTIFICIAL OPENING ENDOSCOPIC, DIAGNOSTIC: ICD-10-PCS | Performed by: INTERNAL MEDICINE

## 2023-04-06 PROCEDURE — 0DB98ZX EXCISION OF DUODENUM, VIA NATURAL OR ARTIFICIAL OPENING ENDOSCOPIC, DIAGNOSTIC: ICD-10-PCS | Performed by: INTERNAL MEDICINE

## 2023-04-06 PROCEDURE — 0DBK8ZZ EXCISION OF ASCENDING COLON, VIA NATURAL OR ARTIFICIAL OPENING ENDOSCOPIC: ICD-10-PCS | Performed by: INTERNAL MEDICINE

## 2023-04-06 PROCEDURE — 30233N1 TRANSFUSION OF NONAUTOLOGOUS RED BLOOD CELLS INTO PERIPHERAL VEIN, PERCUTANEOUS APPROACH: ICD-10-PCS | Performed by: INTERNAL MEDICINE

## 2023-04-06 RX ADMIN — Medication SCH EA: at 06:04

## 2023-04-06 RX ADMIN — HUMAN INSULIN PRN UNITS: 100 INJECTION, SOLUTION SUBCUTANEOUS at 17:55

## 2023-04-06 RX ADMIN — SODIUM CHLORIDE SCH MG: 9 INJECTION, SOLUTION INTRAVENOUS at 09:34

## 2023-04-06 RX ADMIN — Medication SCH EA: at 11:24

## 2023-04-06 RX ADMIN — SIMVASTATIN SCH MG: 40 TABLET, FILM COATED ORAL at 21:48

## 2023-04-06 RX ADMIN — HUMAN INSULIN PRN UNITS: 100 INJECTION, SOLUTION SUBCUTANEOUS at 21:55

## 2023-04-06 RX ADMIN — SODIUM CHLORIDE SCH MG: 9 INJECTION, SOLUTION INTRAVENOUS at 23:48

## 2023-04-06 RX ADMIN — SODIUM FERRIC GLUCONATE COMPLEX IN SUCROSE SCH MLS/HR: 12.5 INJECTION INTRAVENOUS at 09:34

## 2023-04-06 RX ADMIN — Medication SCH UNIT: at 09:33

## 2023-04-06 RX ADMIN — Medication SCH EA: at 21:48

## 2023-04-06 RX ADMIN — HUMAN INSULIN PRN UNITS: 100 INJECTION, SOLUTION SUBCUTANEOUS at 11:51

## 2023-04-06 RX ADMIN — CARVEDILOL SCH MG: 25 TABLET, FILM COATED ORAL at 21:47

## 2023-04-06 RX ADMIN — CARVEDILOL SCH MG: 25 TABLET, FILM COATED ORAL at 09:33

## 2023-04-06 RX ADMIN — Medication SCH EA: at 17:55

## 2023-04-07 VITALS — SYSTOLIC BLOOD PRESSURE: 129 MMHG

## 2023-04-07 VITALS — SYSTOLIC BLOOD PRESSURE: 156 MMHG

## 2023-04-07 VITALS — SYSTOLIC BLOOD PRESSURE: 147 MMHG

## 2023-04-07 VITALS — SYSTOLIC BLOOD PRESSURE: 150 MMHG

## 2023-04-07 LAB
ALBUMIN SERPL BCP-MCNC: 1.3 G/DL (ref 3.4–4.8)
ALT SERPL W P-5'-P-CCNC: 46 U/L (ref 12–78)
ANION GAP SERPL CALCULATED.3IONS-SCNC: 4 MMOL/L (ref 5–15)
AST SERPL W P-5'-P-CCNC: 23 U/L (ref 10–37)
BASOPHILS # BLD AUTO: 0 K/UL (ref 0–0.2)
BASOPHILS NFR BLD AUTO: 0.4 % (ref 0–2)
BILIRUB SERPL-MCNC: 0.3 MG/DL (ref 0–1)
BUN SERPL-MCNC: 29 MG/DL (ref 8–21)
CALCIUM SERPL-MCNC: 7.4 MG/DL (ref 8.4–11)
CHLORIDE SERPL-SCNC: 108 MMOL/L (ref 98–107)
CREAT SERPL-MCNC: 1.57 MG/DL (ref 0.55–1.3)
EOSINOPHIL # BLD AUTO: 0.3 K/UL (ref 0–0.4)
EOSINOPHIL NFR BLD AUTO: 3.5 % (ref 0–4)
ERYTHROCYTE [DISTWIDTH] IN BLOOD BY AUTOMATED COUNT: 17.2 % (ref 9–15)
GFR SERPL CREATININE-BSD FRML MDRD: (no result) ML/MIN (ref 90–?)
GLUCOSE SERPL-MCNC: 161 MG/DL (ref 70–99)
HCT VFR BLD AUTO: 24.6 % (ref 36–48)
HGB BLD-MCNC: 8 G/DL (ref 12–16)
LYMPHOCYTES # BLD AUTO: 1.3 K/UL (ref 1–5.5)
LYMPHOCYTES NFR BLD AUTO: 14.4 % (ref 20.5–51.5)
MCH RBC QN AUTO: 30 PG (ref 27–31)
MCHC RBC AUTO-ENTMCNC: 33 % (ref 32–36)
MCV RBC AUTO: 90 FL (ref 79–98)
MONOCYTES # BLD MANUAL: 0.6 K/UL (ref 0–1)
MONOCYTES # BLD MANUAL: 6.2 % (ref 1.7–9.3)
NEUTROPHILS # BLD AUTO: 6.9 K/UL (ref 1.8–7.7)
NEUTROPHILS NFR BLD AUTO: 75.5 % (ref 40–70)
PHOSPHATE SERPL-MCNC: 2.8 MG/DL (ref 2.7–4.5)
PLATELET # BLD AUTO: 249 K/UL (ref 130–430)
RBC # BLD AUTO: 2.73 MIL/UL (ref 4.2–6.2)
WBC # BLD AUTO: 9.1 K/UL (ref 4.8–10.8)

## 2023-04-07 RX ADMIN — INSULIN GLARGINE SCH UNITS: 100 INJECTION, SOLUTION SUBCUTANEOUS at 21:38

## 2023-04-07 RX ADMIN — Medication SCH EA: at 06:36

## 2023-04-07 RX ADMIN — CARVEDILOL SCH MG: 25 TABLET, FILM COATED ORAL at 20:47

## 2023-04-07 RX ADMIN — SODIUM CHLORIDE SCH MLS/HR: 9 INJECTION, SOLUTION INTRAVENOUS at 19:00

## 2023-04-07 RX ADMIN — HUMAN INSULIN PRN UNITS: 100 INJECTION, SOLUTION SUBCUTANEOUS at 06:39

## 2023-04-07 RX ADMIN — CARVEDILOL SCH MG: 25 TABLET, FILM COATED ORAL at 09:38

## 2023-04-07 RX ADMIN — SODIUM FERRIC GLUCONATE COMPLEX IN SUCROSE SCH MLS/HR: 12.5 INJECTION INTRAVENOUS at 09:37

## 2023-04-07 RX ADMIN — SIMVASTATIN SCH MG: 40 TABLET, FILM COATED ORAL at 21:09

## 2023-04-07 RX ADMIN — INSULIN GLARGINE SCH UNITS: 100 INJECTION, SOLUTION SUBCUTANEOUS at 09:42

## 2023-04-07 RX ADMIN — SIMETHICONE CHEW TAB 80 MG SCH MG: 80 TABLET ORAL at 18:01

## 2023-04-07 RX ADMIN — Medication SCH EA: at 11:51

## 2023-04-07 RX ADMIN — SODIUM CHLORIDE SCH MG: 9 INJECTION, SOLUTION INTRAVENOUS at 09:38

## 2023-04-07 RX ADMIN — HUMAN INSULIN PRN UNITS: 100 INJECTION, SOLUTION SUBCUTANEOUS at 12:32

## 2023-04-07 RX ADMIN — Medication SCH UNIT: at 09:38

## 2023-04-07 RX ADMIN — SODIUM CHLORIDE SCH MG: 9 INJECTION, SOLUTION INTRAVENOUS at 20:45

## 2023-04-07 RX ADMIN — Medication SCH EA: at 21:06

## 2023-04-07 RX ADMIN — HUMAN INSULIN PRN UNITS: 100 INJECTION, SOLUTION SUBCUTANEOUS at 18:03

## 2023-04-07 RX ADMIN — Medication SCH EA: at 18:01

## 2023-04-07 RX ADMIN — SIMETHICONE CHEW TAB 80 MG SCH MG: 80 TABLET ORAL at 20:46

## 2023-04-08 VITALS — SYSTOLIC BLOOD PRESSURE: 154 MMHG

## 2023-04-08 VITALS — SYSTOLIC BLOOD PRESSURE: 153 MMHG

## 2023-04-08 VITALS — SYSTOLIC BLOOD PRESSURE: 157 MMHG

## 2023-04-08 VITALS — SYSTOLIC BLOOD PRESSURE: 151 MMHG

## 2023-04-08 LAB
ANION GAP SERPL CALCULATED.3IONS-SCNC: 6 MMOL/L (ref 5–15)
BASOPHILS # BLD AUTO: 0 K/UL (ref 0–0.2)
BASOPHILS NFR BLD AUTO: 0.4 % (ref 0–2)
BUN SERPL-MCNC: 22 MG/DL (ref 8–21)
CALCIUM SERPL-MCNC: 7.3 MG/DL (ref 8.4–11)
CHLORIDE SERPL-SCNC: 113 MMOL/L (ref 98–107)
CREAT SERPL-MCNC: 1.28 MG/DL (ref 0.55–1.3)
EOSINOPHIL # BLD AUTO: 0.2 K/UL (ref 0–0.4)
EOSINOPHIL NFR BLD AUTO: 3.3 % (ref 0–4)
ERYTHROCYTE [DISTWIDTH] IN BLOOD BY AUTOMATED COUNT: 17.3 % (ref 9–15)
GFR SERPL CREATININE-BSD FRML MDRD: (no result) ML/MIN (ref 90–?)
GLUCOSE SERPL-MCNC: 60 MG/DL (ref 70–99)
HCT VFR BLD AUTO: 25.4 % (ref 36–48)
HGB BLD-MCNC: 8.2 G/DL (ref 12–16)
LYMPHOCYTES # BLD AUTO: 0.8 K/UL (ref 1–5.5)
LYMPHOCYTES NFR BLD AUTO: 10.4 % (ref 20.5–51.5)
MCH RBC QN AUTO: 30 PG (ref 27–31)
MCHC RBC AUTO-ENTMCNC: 32 % (ref 32–36)
MCV RBC AUTO: 91 FL (ref 79–98)
MONOCYTES # BLD MANUAL: 0.4 K/UL (ref 0–1)
MONOCYTES # BLD MANUAL: 4.9 % (ref 1.7–9.3)
NEUTROPHILS # BLD AUTO: 6.1 K/UL (ref 1.8–7.7)
NEUTROPHILS NFR BLD AUTO: 81 % (ref 40–70)
PHOSPHATE SERPL-MCNC: 2.8 MG/DL (ref 2.7–4.5)
PLATELET # BLD AUTO: 231 K/UL (ref 130–430)
RBC # BLD AUTO: 2.79 MIL/UL (ref 4.2–6.2)
WBC # BLD AUTO: 7.5 K/UL (ref 4.8–10.8)

## 2023-04-08 RX ADMIN — Medication SCH EA: at 12:15

## 2023-04-08 RX ADMIN — SIMETHICONE CHEW TAB 80 MG SCH MG: 80 TABLET ORAL at 21:08

## 2023-04-08 RX ADMIN — SIMVASTATIN SCH MG: 40 TABLET, FILM COATED ORAL at 21:07

## 2023-04-08 RX ADMIN — SODIUM CHLORIDE SCH MLS/HR: 9 INJECTION, SOLUTION INTRAVENOUS at 15:02

## 2023-04-08 RX ADMIN — Medication SCH EA: at 16:31

## 2023-04-08 RX ADMIN — Medication SCH EA: at 07:28

## 2023-04-08 RX ADMIN — SODIUM CHLORIDE SCH MG: 9 INJECTION, SOLUTION INTRAVENOUS at 09:09

## 2023-04-08 RX ADMIN — Medication SCH UNIT: at 09:10

## 2023-04-08 RX ADMIN — Medication SCH MG: at 09:10

## 2023-04-08 RX ADMIN — INSULIN GLARGINE SCH UNITS: 100 INJECTION, SOLUTION SUBCUTANEOUS at 21:16

## 2023-04-08 RX ADMIN — SIMETHICONE CHEW TAB 80 MG SCH MG: 80 TABLET ORAL at 16:32

## 2023-04-08 RX ADMIN — SIMETHICONE CHEW TAB 80 MG SCH MG: 80 TABLET ORAL at 11:36

## 2023-04-08 RX ADMIN — INSULIN GLARGINE SCH UNITS: 100 INJECTION, SOLUTION SUBCUTANEOUS at 09:00

## 2023-04-08 RX ADMIN — CARVEDILOL SCH MG: 25 TABLET, FILM COATED ORAL at 09:09

## 2023-04-08 RX ADMIN — SODIUM CHLORIDE SCH MG: 9 INJECTION, SOLUTION INTRAVENOUS at 21:08

## 2023-04-08 RX ADMIN — SIMETHICONE CHEW TAB 80 MG SCH MG: 80 TABLET ORAL at 09:14

## 2023-04-08 RX ADMIN — Medication SCH EA: at 21:12

## 2023-04-08 RX ADMIN — CARVEDILOL SCH MG: 25 TABLET, FILM COATED ORAL at 21:07

## 2023-04-09 VITALS — SYSTOLIC BLOOD PRESSURE: 156 MMHG

## 2023-04-09 VITALS — SYSTOLIC BLOOD PRESSURE: 151 MMHG

## 2023-04-09 VITALS — SYSTOLIC BLOOD PRESSURE: 154 MMHG

## 2023-04-09 VITALS — SYSTOLIC BLOOD PRESSURE: 149 MMHG

## 2023-04-09 VITALS — SYSTOLIC BLOOD PRESSURE: 153 MMHG

## 2023-04-09 LAB
ALBUMIN SERPL BCP-MCNC: 1.6 G/DL (ref 3.4–4.8)
ALT SERPL W P-5'-P-CCNC: 36 U/L (ref 12–78)
ANION GAP SERPL CALCULATED.3IONS-SCNC: 6 MMOL/L (ref 5–15)
AST SERPL W P-5'-P-CCNC: 21 U/L (ref 10–37)
BASOPHILS # BLD AUTO: 0.1 K/UL (ref 0–0.2)
BASOPHILS NFR BLD AUTO: 0.8 % (ref 0–2)
BILIRUB SERPL-MCNC: 0.5 MG/DL (ref 0–1)
BUN SERPL-MCNC: 21 MG/DL (ref 8–21)
CALCIUM SERPL-MCNC: 7.9 MG/DL (ref 8.4–11)
CHLORIDE SERPL-SCNC: 113 MMOL/L (ref 98–107)
CREAT SERPL-MCNC: 1.2 MG/DL (ref 0.55–1.3)
EOSINOPHIL # BLD AUTO: 0.6 K/UL (ref 0–0.4)
EOSINOPHIL NFR BLD AUTO: 6.3 % (ref 0–4)
ERYTHROCYTE [DISTWIDTH] IN BLOOD BY AUTOMATED COUNT: 18.2 % (ref 9–15)
GFR SERPL CREATININE-BSD FRML MDRD: (no result) ML/MIN (ref 90–?)
GLUCOSE SERPL-MCNC: 89 MG/DL (ref 70–99)
HCT VFR BLD AUTO: 27.2 % (ref 36–48)
HGB BLD-MCNC: 8.8 G/DL (ref 12–16)
LYMPHOCYTES # BLD AUTO: 1.1 K/UL (ref 1–5.5)
LYMPHOCYTES NFR BLD AUTO: 11.6 % (ref 20.5–51.5)
MCH RBC QN AUTO: 30 PG (ref 27–31)
MCHC RBC AUTO-ENTMCNC: 33 % (ref 32–36)
MCV RBC AUTO: 92 FL (ref 79–98)
MONOCYTES # BLD MANUAL: 0.4 K/UL (ref 0–1)
MONOCYTES # BLD MANUAL: 4.3 % (ref 1.7–9.3)
NEUTROPHILS # BLD AUTO: 7.1 K/UL (ref 1.8–7.7)
NEUTROPHILS NFR BLD AUTO: 77 % (ref 40–70)
PHOSPHATE SERPL-MCNC: 3.1 MG/DL (ref 2.7–4.5)
PLATELET # BLD AUTO: 237 K/UL (ref 130–430)
RBC # BLD AUTO: 2.96 MIL/UL (ref 4.2–6.2)
WBC # BLD AUTO: 9.2 K/UL (ref 4.8–10.8)

## 2023-04-09 RX ADMIN — Medication SCH EA: at 17:13

## 2023-04-09 RX ADMIN — SODIUM CHLORIDE SCH MG: 9 INJECTION, SOLUTION INTRAVENOUS at 21:53

## 2023-04-09 RX ADMIN — CARVEDILOL SCH MG: 25 TABLET, FILM COATED ORAL at 08:47

## 2023-04-09 RX ADMIN — Medication SCH UNIT: at 08:46

## 2023-04-09 RX ADMIN — Medication SCH MG: at 08:46

## 2023-04-09 RX ADMIN — SODIUM CHLORIDE SCH MG: 9 INJECTION, SOLUTION INTRAVENOUS at 08:45

## 2023-04-09 RX ADMIN — FUROSEMIDE SCH MG: 10 INJECTION, SOLUTION INTRAMUSCULAR; INTRAVENOUS at 08:52

## 2023-04-09 RX ADMIN — SIMETHICONE CHEW TAB 80 MG SCH MG: 80 TABLET ORAL at 17:14

## 2023-04-09 RX ADMIN — Medication SCH EA: at 06:24

## 2023-04-09 RX ADMIN — HUMAN INSULIN PRN UNITS: 100 INJECTION, SOLUTION SUBCUTANEOUS at 21:51

## 2023-04-09 RX ADMIN — INSULIN GLARGINE SCH UNITS: 100 INJECTION, SOLUTION SUBCUTANEOUS at 21:51

## 2023-04-09 RX ADMIN — SIMVASTATIN SCH MG: 40 TABLET, FILM COATED ORAL at 21:48

## 2023-04-09 RX ADMIN — SODIUM CHLORIDE SCH MLS/HR: 9 INJECTION, SOLUTION INTRAVENOUS at 03:40

## 2023-04-09 RX ADMIN — SIMETHICONE CHEW TAB 80 MG SCH MG: 80 TABLET ORAL at 11:30

## 2023-04-09 RX ADMIN — SIMETHICONE CHEW TAB 80 MG SCH MG: 80 TABLET ORAL at 06:23

## 2023-04-09 RX ADMIN — INSULIN GLARGINE SCH UNITS: 100 INJECTION, SOLUTION SUBCUTANEOUS at 08:44

## 2023-04-09 RX ADMIN — CARVEDILOL SCH MG: 25 TABLET, FILM COATED ORAL at 21:48

## 2023-04-09 RX ADMIN — Medication SCH MG: at 08:48

## 2023-04-09 RX ADMIN — SIMETHICONE CHEW TAB 80 MG SCH MG: 80 TABLET ORAL at 21:48

## 2023-04-09 RX ADMIN — Medication SCH EA: at 21:37

## 2023-04-09 RX ADMIN — Medication SCH EA: at 12:09

## 2023-04-10 VITALS — SYSTOLIC BLOOD PRESSURE: 150 MMHG

## 2023-04-10 VITALS — SYSTOLIC BLOOD PRESSURE: 165 MMHG

## 2023-04-10 VITALS — SYSTOLIC BLOOD PRESSURE: 159 MMHG

## 2023-04-10 VITALS — SYSTOLIC BLOOD PRESSURE: 151 MMHG

## 2023-04-10 VITALS — SYSTOLIC BLOOD PRESSURE: 143 MMHG

## 2023-04-10 LAB
ALBUMIN SERPL BCP-MCNC: 1.4 G/DL (ref 3.4–4.8)
ALT SERPL W P-5'-P-CCNC: 26 U/L (ref 12–78)
ANION GAP SERPL CALCULATED.3IONS-SCNC: 4 MMOL/L (ref 5–15)
AST SERPL W P-5'-P-CCNC: 17 U/L (ref 10–37)
BASOPHILS # BLD AUTO: 0.1 K/UL (ref 0–0.2)
BASOPHILS NFR BLD AUTO: 1.1 % (ref 0–2)
BILIRUB SERPL-MCNC: 0.4 MG/DL (ref 0–1)
BUN SERPL-MCNC: 21 MG/DL (ref 8–21)
CALCIUM SERPL-MCNC: 7.9 MG/DL (ref 8.4–11)
CHLORIDE SERPL-SCNC: 115 MMOL/L (ref 98–107)
CREAT SERPL-MCNC: 1.38 MG/DL (ref 0.55–1.3)
EOSINOPHIL # BLD AUTO: 0.7 K/UL (ref 0–0.4)
EOSINOPHIL NFR BLD AUTO: 8.1 % (ref 0–4)
ERYTHROCYTE [DISTWIDTH] IN BLOOD BY AUTOMATED COUNT: 18.1 % (ref 9–15)
GFR SERPL CREATININE-BSD FRML MDRD: (no result) ML/MIN (ref 90–?)
GLUCOSE SERPL-MCNC: 71 MG/DL (ref 70–99)
HCT VFR BLD AUTO: 25.9 % (ref 36–48)
HGB BLD-MCNC: 8.3 G/DL (ref 12–16)
LYMPHOCYTES # BLD AUTO: 1 K/UL (ref 1–5.5)
LYMPHOCYTES NFR BLD AUTO: 12.5 % (ref 20.5–51.5)
MCH RBC QN AUTO: 30 PG (ref 27–31)
MCHC RBC AUTO-ENTMCNC: 32 % (ref 32–36)
MCV RBC AUTO: 93 FL (ref 79–98)
MONOCYTES # BLD MANUAL: 0.5 K/UL (ref 0–1)
MONOCYTES # BLD MANUAL: 5.6 % (ref 1.7–9.3)
NEUTROPHILS # BLD AUTO: 6 K/UL (ref 1.8–7.7)
NEUTROPHILS NFR BLD AUTO: 72.7 % (ref 40–70)
PHOSPHATE SERPL-MCNC: 3.6 MG/DL (ref 2.7–4.5)
PLATELET # BLD AUTO: 230 K/UL (ref 130–430)
RBC # BLD AUTO: 2.8 MIL/UL (ref 4.2–6.2)
WBC # BLD AUTO: 8.2 K/UL (ref 4.8–10.8)

## 2023-04-10 RX ADMIN — CARVEDILOL SCH MG: 25 TABLET, FILM COATED ORAL at 20:52

## 2023-04-10 RX ADMIN — TAZOBACTAM SODIUM AND PIPERACILLIN SODIUM SCH MLS/HR: 375; 3 INJECTION, SOLUTION INTRAVENOUS at 17:32

## 2023-04-10 RX ADMIN — SODIUM CHLORIDE SCH MLS/HR: 9 INJECTION, SOLUTION INTRAVENOUS at 06:30

## 2023-04-10 RX ADMIN — CARVEDILOL SCH MG: 25 TABLET, FILM COATED ORAL at 10:09

## 2023-04-10 RX ADMIN — Medication SCH MG: at 10:10

## 2023-04-10 RX ADMIN — TAZOBACTAM SODIUM AND PIPERACILLIN SODIUM SCH MLS/HR: 375; 3 INJECTION, SOLUTION INTRAVENOUS at 12:53

## 2023-04-10 RX ADMIN — HYDROCODONE BITARTRATE AND ACETAMINOPHEN PRN TAB: 10; 325 TABLET ORAL at 21:03

## 2023-04-10 RX ADMIN — SIMETHICONE CHEW TAB 80 MG SCH MG: 80 TABLET ORAL at 11:25

## 2023-04-10 RX ADMIN — Medication SCH EA: at 17:41

## 2023-04-10 RX ADMIN — SIMETHICONE CHEW TAB 80 MG SCH MG: 80 TABLET ORAL at 06:27

## 2023-04-10 RX ADMIN — INSULIN GLARGINE SCH UNITS: 100 INJECTION, SOLUTION SUBCUTANEOUS at 09:00

## 2023-04-10 RX ADMIN — FUROSEMIDE SCH MG: 10 INJECTION, SOLUTION INTRAMUSCULAR; INTRAVENOUS at 21:34

## 2023-04-10 RX ADMIN — HYDROCODONE BITARTRATE AND ACETAMINOPHEN PRN TAB: 10; 325 TABLET ORAL at 10:08

## 2023-04-10 RX ADMIN — Medication SCH MG: at 10:08

## 2023-04-10 RX ADMIN — FUROSEMIDE SCH MG: 10 INJECTION, SOLUTION INTRAMUSCULAR; INTRAVENOUS at 10:10

## 2023-04-10 RX ADMIN — SODIUM CHLORIDE SCH MG: 9 INJECTION, SOLUTION INTRAVENOUS at 20:50

## 2023-04-10 RX ADMIN — SIMETHICONE CHEW TAB 80 MG SCH MG: 80 TABLET ORAL at 20:51

## 2023-04-10 RX ADMIN — HUMAN INSULIN PRN UNITS: 100 INJECTION, SOLUTION SUBCUTANEOUS at 21:29

## 2023-04-10 RX ADMIN — SODIUM CHLORIDE SCH MG: 9 INJECTION, SOLUTION INTRAVENOUS at 10:10

## 2023-04-10 RX ADMIN — INSULIN GLARGINE SCH UNITS: 100 INJECTION, SOLUTION SUBCUTANEOUS at 21:24

## 2023-04-10 RX ADMIN — Medication SCH EA: at 06:30

## 2023-04-10 RX ADMIN — SIMVASTATIN SCH MG: 40 TABLET, FILM COATED ORAL at 20:52

## 2023-04-10 RX ADMIN — HYDROCODONE BITARTRATE AND ACETAMINOPHEN PRN TAB: 10; 325 TABLET ORAL at 21:13

## 2023-04-10 RX ADMIN — Medication SCH UNIT: at 10:08

## 2023-04-10 RX ADMIN — Medication SCH EA: at 11:25

## 2023-04-10 RX ADMIN — Medication SCH EA: at 21:18

## 2023-04-10 RX ADMIN — SIMETHICONE CHEW TAB 80 MG SCH MG: 80 TABLET ORAL at 16:23

## 2023-04-11 VITALS — SYSTOLIC BLOOD PRESSURE: 145 MMHG

## 2023-04-11 VITALS — SYSTOLIC BLOOD PRESSURE: 139 MMHG

## 2023-04-11 VITALS — SYSTOLIC BLOOD PRESSURE: 142 MMHG

## 2023-04-11 LAB
ANION GAP SERPL CALCULATED.3IONS-SCNC: 7 MMOL/L (ref 5–15)
BASOPHILS # BLD AUTO: 0 K/UL (ref 0–0.2)
BASOPHILS # BLD AUTO: 0.1 K/UL (ref 0–0.2)
BASOPHILS NFR BLD AUTO: 0.4 % (ref 0–2)
BASOPHILS NFR BLD AUTO: 1.1 % (ref 0–2)
BUN SERPL-MCNC: 22 MG/DL (ref 8–21)
CALCIUM SERPL-MCNC: 7.7 MG/DL (ref 8.4–11)
CHLORIDE SERPL-SCNC: 113 MMOL/L (ref 98–107)
CREAT SERPL-MCNC: 1.88 MG/DL (ref 0.55–1.3)
EOSINOPHIL # BLD AUTO: 0.5 K/UL (ref 0–0.4)
EOSINOPHIL # BLD AUTO: 0.7 K/UL (ref 0–0.4)
EOSINOPHIL NFR BLD AUTO: 7.5 % (ref 0–4)
EOSINOPHIL NFR BLD AUTO: 9.5 % (ref 0–4)
ERYTHROCYTE [DISTWIDTH] IN BLOOD BY AUTOMATED COUNT: 18.1 % (ref 9–15)
ERYTHROCYTE [DISTWIDTH] IN BLOOD BY AUTOMATED COUNT: 18.6 % (ref 9–15)
GFR SERPL CREATININE-BSD FRML MDRD: (no result) ML/MIN (ref 90–?)
GLUCOSE SERPL-MCNC: 59 MG/DL (ref 70–99)
HCT VFR BLD AUTO: 23.8 % (ref 36–48)
HCT VFR BLD AUTO: 25.9 % (ref 36–48)
HGB BLD-MCNC: 7.6 G/DL (ref 12–16)
HGB BLD-MCNC: 8.1 G/DL (ref 12–16)
LYMPHOCYTES # BLD AUTO: 1.2 K/UL (ref 1–5.5)
LYMPHOCYTES # BLD AUTO: 1.3 K/UL (ref 1–5.5)
LYMPHOCYTES NFR BLD AUTO: 17.4 % (ref 20.5–51.5)
LYMPHOCYTES NFR BLD AUTO: 18.2 % (ref 20.5–51.5)
MCH RBC QN AUTO: 29 PG (ref 27–31)
MCH RBC QN AUTO: 30 PG (ref 27–31)
MCHC RBC AUTO-ENTMCNC: 31 % (ref 32–36)
MCHC RBC AUTO-ENTMCNC: 32 % (ref 32–36)
MCV RBC AUTO: 93 FL (ref 79–98)
MCV RBC AUTO: 94 FL (ref 79–98)
MONOCYTES # BLD MANUAL: 0.5 K/UL (ref 0–1)
MONOCYTES # BLD MANUAL: 0.5 K/UL (ref 0–1)
MONOCYTES # BLD MANUAL: 7 % (ref 1.7–9.3)
MONOCYTES # BLD MANUAL: 7.3 % (ref 1.7–9.3)
NEUTROPHILS # BLD AUTO: 4.3 K/UL (ref 1.8–7.7)
NEUTROPHILS # BLD AUTO: 4.7 K/UL (ref 1.8–7.7)
NEUTROPHILS NFR BLD AUTO: 64.7 % (ref 40–70)
NEUTROPHILS NFR BLD AUTO: 66.9 % (ref 40–70)
PLATELET # BLD AUTO: 192 K/UL (ref 130–430)
PLATELET # BLD AUTO: 219 K/UL (ref 130–430)
RBC # BLD AUTO: 2.55 MIL/UL (ref 4.2–6.2)
RBC # BLD AUTO: 2.77 MIL/UL (ref 4.2–6.2)
WBC # BLD AUTO: 6.5 K/UL (ref 4.8–10.8)
WBC # BLD AUTO: 7.3 K/UL (ref 4.8–10.8)

## 2023-04-11 RX ADMIN — TAZOBACTAM SODIUM AND PIPERACILLIN SODIUM SCH MLS/HR: 375; 3 INJECTION, SOLUTION INTRAVENOUS at 18:08

## 2023-04-11 RX ADMIN — HYDROCODONE BITARTRATE AND ACETAMINOPHEN PRN TAB: 10; 325 TABLET ORAL at 09:56

## 2023-04-11 RX ADMIN — Medication SCH EA: at 12:20

## 2023-04-11 RX ADMIN — SIMVASTATIN SCH MG: 40 TABLET, FILM COATED ORAL at 20:08

## 2023-04-11 RX ADMIN — TAZOBACTAM SODIUM AND PIPERACILLIN SODIUM SCH MLS/HR: 375; 3 INJECTION, SOLUTION INTRAVENOUS at 00:00

## 2023-04-11 RX ADMIN — TAZOBACTAM SODIUM AND PIPERACILLIN SODIUM SCH MLS/HR: 375; 3 INJECTION, SOLUTION INTRAVENOUS at 23:00

## 2023-04-11 RX ADMIN — Medication SCH EA: at 20:15

## 2023-04-11 RX ADMIN — Medication SCH MG: at 09:56

## 2023-04-11 RX ADMIN — SODIUM CHLORIDE SCH MG: 9 INJECTION, SOLUTION INTRAVENOUS at 22:45

## 2023-04-11 RX ADMIN — Medication SCH UNIT: at 09:55

## 2023-04-11 RX ADMIN — FUROSEMIDE SCH MG: 10 INJECTION, SOLUTION INTRAMUSCULAR; INTRAVENOUS at 14:47

## 2023-04-11 RX ADMIN — SODIUM CHLORIDE SCH MG: 9 INJECTION, SOLUTION INTRAVENOUS at 09:58

## 2023-04-11 RX ADMIN — TAZOBACTAM SODIUM AND PIPERACILLIN SODIUM SCH MLS/HR: 375; 3 INJECTION, SOLUTION INTRAVENOUS at 13:04

## 2023-04-11 RX ADMIN — CARVEDILOL SCH MG: 25 TABLET, FILM COATED ORAL at 20:08

## 2023-04-11 RX ADMIN — FUROSEMIDE SCH MG: 10 INJECTION, SOLUTION INTRAMUSCULAR; INTRAVENOUS at 09:59

## 2023-04-11 RX ADMIN — HUMAN INSULIN PRN UNITS: 100 INJECTION, SOLUTION SUBCUTANEOUS at 20:18

## 2023-04-11 RX ADMIN — SIMETHICONE CHEW TAB 80 MG SCH MG: 80 TABLET ORAL at 18:08

## 2023-04-11 RX ADMIN — GABAPENTIN SCH MG: 100 CAPSULE ORAL at 20:08

## 2023-04-11 RX ADMIN — Medication SCH MG: at 09:55

## 2023-04-11 RX ADMIN — SIMETHICONE CHEW TAB 80 MG SCH MG: 80 TABLET ORAL at 13:03

## 2023-04-11 RX ADMIN — Medication SCH EA: at 17:32

## 2023-04-11 RX ADMIN — Medication SCH EA: at 07:16

## 2023-04-11 RX ADMIN — TAZOBACTAM SODIUM AND PIPERACILLIN SODIUM SCH MLS/HR: 375; 3 INJECTION, SOLUTION INTRAVENOUS at 07:15

## 2023-04-11 RX ADMIN — SIMETHICONE CHEW TAB 80 MG SCH MG: 80 TABLET ORAL at 07:36

## 2023-04-11 RX ADMIN — INSULIN GLARGINE SCH UNITS: 100 INJECTION, SOLUTION SUBCUTANEOUS at 20:18

## 2023-04-11 RX ADMIN — CARVEDILOL SCH MG: 25 TABLET, FILM COATED ORAL at 09:57

## 2023-04-11 RX ADMIN — SIMETHICONE CHEW TAB 80 MG SCH MG: 80 TABLET ORAL at 20:07

## 2023-04-11 RX ADMIN — HUMAN INSULIN PRN UNITS: 100 INJECTION, SOLUTION SUBCUTANEOUS at 18:12

## 2023-04-11 RX ADMIN — INSULIN GLARGINE SCH UNITS: 100 INJECTION, SOLUTION SUBCUTANEOUS at 10:14

## 2023-04-11 RX ADMIN — FUROSEMIDE SCH MG: 10 INJECTION, SOLUTION INTRAMUSCULAR; INTRAVENOUS at 22:46

## 2023-04-12 VITALS — SYSTOLIC BLOOD PRESSURE: 151 MMHG

## 2023-04-12 VITALS — SYSTOLIC BLOOD PRESSURE: 142 MMHG

## 2023-04-12 VITALS — SYSTOLIC BLOOD PRESSURE: 133 MMHG

## 2023-04-12 VITALS — SYSTOLIC BLOOD PRESSURE: 124 MMHG

## 2023-04-12 VITALS — SYSTOLIC BLOOD PRESSURE: 152 MMHG

## 2023-04-12 LAB
ANION GAP SERPL CALCULATED.3IONS-SCNC: 7 MMOL/L (ref 5–15)
BASOPHILS # BLD AUTO: 0.1 K/UL (ref 0–0.2)
BASOPHILS NFR BLD AUTO: 1.4 % (ref 0–2)
BUN SERPL-MCNC: 26 MG/DL (ref 8–21)
CALCIUM SERPL-MCNC: 7.5 MG/DL (ref 8.4–11)
CHLORIDE SERPL-SCNC: 110 MMOL/L (ref 98–107)
CREAT SERPL-MCNC: 2.1 MG/DL (ref 0.55–1.3)
EOSINOPHIL # BLD AUTO: 0.5 K/UL (ref 0–0.4)
EOSINOPHIL NFR BLD AUTO: 7.3 % (ref 0–4)
ERYTHROCYTE [DISTWIDTH] IN BLOOD BY AUTOMATED COUNT: 17.5 % (ref 9–15)
GFR SERPL CREATININE-BSD FRML MDRD: (no result) ML/MIN (ref 90–?)
GLUCOSE SERPL-MCNC: 108 MG/DL (ref 70–99)
HCT VFR BLD AUTO: 22.8 % (ref 36–48)
HGB BLD-MCNC: 7.3 G/DL (ref 12–16)
LYMPHOCYTES # BLD AUTO: 1.2 K/UL (ref 1–5.5)
LYMPHOCYTES NFR BLD AUTO: 19.3 % (ref 20.5–51.5)
MCH RBC QN AUTO: 30 PG (ref 27–31)
MCHC RBC AUTO-ENTMCNC: 32 % (ref 32–36)
MCV RBC AUTO: 93 FL (ref 79–98)
MONOCYTES # BLD MANUAL: 0.5 K/UL (ref 0–1)
MONOCYTES # BLD MANUAL: 8.1 % (ref 1.7–9.3)
NEUTROPHILS # BLD AUTO: 4 K/UL (ref 1.8–7.7)
NEUTROPHILS NFR BLD AUTO: 63.9 % (ref 40–70)
PLATELET # BLD AUTO: 178 K/UL (ref 130–430)
RBC # BLD AUTO: 2.46 MIL/UL (ref 4.2–6.2)
WBC # BLD AUTO: 6.3 K/UL (ref 4.8–10.8)

## 2023-04-12 RX ADMIN — SIMETHICONE CHEW TAB 80 MG SCH MG: 80 TABLET ORAL at 17:37

## 2023-04-12 RX ADMIN — SIMVASTATIN SCH MG: 40 TABLET, FILM COATED ORAL at 21:53

## 2023-04-12 RX ADMIN — Medication SCH MG: at 09:01

## 2023-04-12 RX ADMIN — HYDROCODONE BITARTRATE AND ACETAMINOPHEN PRN TAB: 10; 325 TABLET ORAL at 09:02

## 2023-04-12 RX ADMIN — SIMETHICONE CHEW TAB 80 MG SCH MG: 80 TABLET ORAL at 06:08

## 2023-04-12 RX ADMIN — Medication SCH UNIT: at 09:05

## 2023-04-12 RX ADMIN — INSULIN GLARGINE SCH UNITS: 100 INJECTION, SOLUTION SUBCUTANEOUS at 09:16

## 2023-04-12 RX ADMIN — CARVEDILOL SCH MG: 25 TABLET, FILM COATED ORAL at 09:04

## 2023-04-12 RX ADMIN — SIMETHICONE CHEW TAB 80 MG SCH MG: 80 TABLET ORAL at 12:01

## 2023-04-12 RX ADMIN — FUROSEMIDE SCH MG: 10 INJECTION, SOLUTION INTRAMUSCULAR; INTRAVENOUS at 06:14

## 2023-04-12 RX ADMIN — SODIUM CHLORIDE SCH MG: 9 INJECTION, SOLUTION INTRAVENOUS at 21:45

## 2023-04-12 RX ADMIN — GABAPENTIN SCH MG: 100 CAPSULE ORAL at 21:53

## 2023-04-12 RX ADMIN — FUROSEMIDE SCH MG: 10 INJECTION, SOLUTION INTRAMUSCULAR; INTRAVENOUS at 16:08

## 2023-04-12 RX ADMIN — FUROSEMIDE SCH MG: 10 INJECTION, SOLUTION INTRAMUSCULAR; INTRAVENOUS at 21:46

## 2023-04-12 RX ADMIN — TAZOBACTAM SODIUM AND PIPERACILLIN SODIUM SCH MLS/HR: 375; 3 INJECTION, SOLUTION INTRAVENOUS at 12:01

## 2023-04-12 RX ADMIN — CARVEDILOL SCH MG: 25 TABLET, FILM COATED ORAL at 21:47

## 2023-04-12 RX ADMIN — SODIUM CHLORIDE SCH MG: 9 INJECTION, SOLUTION INTRAVENOUS at 09:01

## 2023-04-12 RX ADMIN — TAZOBACTAM SODIUM AND PIPERACILLIN SODIUM SCH MLS/HR: 375; 3 INJECTION, SOLUTION INTRAVENOUS at 06:12

## 2023-04-12 RX ADMIN — SIMETHICONE CHEW TAB 80 MG SCH MG: 80 TABLET ORAL at 21:45

## 2023-04-12 RX ADMIN — HUMAN INSULIN PRN UNITS: 100 INJECTION, SOLUTION SUBCUTANEOUS at 12:10

## 2023-04-12 RX ADMIN — Medication SCH EA: at 17:38

## 2023-04-12 RX ADMIN — Medication SCH EA: at 12:14

## 2023-04-12 RX ADMIN — Medication SCH EA: at 06:23

## 2023-04-12 RX ADMIN — TAZOBACTAM SODIUM AND PIPERACILLIN SODIUM SCH MLS/HR: 375; 3 INJECTION, SOLUTION INTRAVENOUS at 17:37

## 2023-04-12 RX ADMIN — INSULIN GLARGINE SCH UNITS: 100 INJECTION, SOLUTION SUBCUTANEOUS at 21:52

## 2023-04-12 RX ADMIN — Medication SCH EA: at 21:48

## 2023-04-13 VITALS — SYSTOLIC BLOOD PRESSURE: 138 MMHG

## 2023-04-13 VITALS — SYSTOLIC BLOOD PRESSURE: 132 MMHG

## 2023-04-13 VITALS — SYSTOLIC BLOOD PRESSURE: 129 MMHG

## 2023-04-13 VITALS — SYSTOLIC BLOOD PRESSURE: 155 MMHG

## 2023-04-13 LAB
BASOPHILS # BLD AUTO: 0.1 K/UL (ref 0–0.2)
BASOPHILS NFR BLD AUTO: 1.3 % (ref 0–2)
EOSINOPHIL # BLD AUTO: 0.3 K/UL (ref 0–0.4)
EOSINOPHIL NFR BLD AUTO: 5 % (ref 0–4)
ERYTHROCYTE [DISTWIDTH] IN BLOOD BY AUTOMATED COUNT: 17.8 % (ref 9–15)
HCT VFR BLD AUTO: 23.5 % (ref 36–48)
HGB BLD-MCNC: 7.5 G/DL (ref 12–16)
LYMPHOCYTES # BLD AUTO: 0.8 K/UL (ref 1–5.5)
LYMPHOCYTES NFR BLD AUTO: 12.3 % (ref 20.5–51.5)
MCH RBC QN AUTO: 30 PG (ref 27–31)
MCHC RBC AUTO-ENTMCNC: 32 % (ref 32–36)
MCV RBC AUTO: 93 FL (ref 79–98)
MONOCYTES # BLD MANUAL: 0.3 K/UL (ref 0–1)
MONOCYTES # BLD MANUAL: 5 % (ref 1.7–9.3)
NEUTROPHILS # BLD AUTO: 4.8 K/UL (ref 1.8–7.7)
NEUTROPHILS NFR BLD AUTO: 76.4 % (ref 40–70)
PLATELET # BLD AUTO: 180 K/UL (ref 130–430)
RBC # BLD AUTO: 2.54 MIL/UL (ref 4.2–6.2)
WBC # BLD AUTO: 6.2 K/UL (ref 4.8–10.8)

## 2023-04-13 RX ADMIN — CARVEDILOL SCH MG: 25 TABLET, FILM COATED ORAL at 09:31

## 2023-04-13 RX ADMIN — HUMAN INSULIN PRN UNITS: 100 INJECTION, SOLUTION SUBCUTANEOUS at 11:42

## 2023-04-13 RX ADMIN — Medication SCH MG: at 09:30

## 2023-04-13 RX ADMIN — SIMETHICONE CHEW TAB 80 MG SCH MG: 80 TABLET ORAL at 06:21

## 2023-04-13 RX ADMIN — SODIUM CHLORIDE SCH MG: 9 INJECTION, SOLUTION INTRAVENOUS at 09:27

## 2023-04-13 RX ADMIN — TAZOBACTAM SODIUM AND PIPERACILLIN SODIUM SCH MLS/HR: 375; 3 INJECTION, SOLUTION INTRAVENOUS at 00:53

## 2023-04-13 RX ADMIN — Medication SCH EA: at 11:38

## 2023-04-13 RX ADMIN — TAZOBACTAM SODIUM AND PIPERACILLIN SODIUM SCH MLS/HR: 375; 3 INJECTION, SOLUTION INTRAVENOUS at 06:02

## 2023-04-13 RX ADMIN — FUROSEMIDE SCH MG: 10 INJECTION, SOLUTION INTRAMUSCULAR; INTRAVENOUS at 15:14

## 2023-04-13 RX ADMIN — HYDROCODONE BITARTRATE AND ACETAMINOPHEN PRN TAB: 10; 325 TABLET ORAL at 11:38

## 2023-04-13 RX ADMIN — FUROSEMIDE SCH MG: 10 INJECTION, SOLUTION INTRAMUSCULAR; INTRAVENOUS at 06:06

## 2023-04-13 RX ADMIN — Medication SCH EA: at 06:21

## 2023-04-13 RX ADMIN — Medication SCH MG: at 09:28

## 2023-04-13 RX ADMIN — SIMETHICONE CHEW TAB 80 MG SCH MG: 80 TABLET ORAL at 11:37

## 2023-04-13 RX ADMIN — INSULIN GLARGINE SCH UNITS: 100 INJECTION, SOLUTION SUBCUTANEOUS at 09:35

## 2023-04-13 RX ADMIN — Medication SCH UNIT: at 09:28

## 2023-05-09 NOTE — NUR
2100p medications administered.blood glucose assessed.apprised pt.that i am 2 initiate the 
d/c pt's instructions.

i have d/c iv access,removed the pt's id band. Screening labs  Check urinalysis in 3 days  Up to date pap  Please aim to eat a diet high in fresh fruits and vegetables, lean protein sources, complex carbohydrates and limited processed and fast foods. Try to get at least 150 minutes of exercise per week-a combination of weight resistance and cardio is preferred.